# Patient Record
Sex: MALE | Race: WHITE | Employment: UNEMPLOYED | URBAN - METROPOLITAN AREA
[De-identification: names, ages, dates, MRNs, and addresses within clinical notes are randomized per-mention and may not be internally consistent; named-entity substitution may affect disease eponyms.]

---

## 2024-10-11 ENCOUNTER — OFFICE VISIT (OUTPATIENT)
Dept: URGENT CARE | Age: 24
End: 2024-10-11

## 2024-10-11 VITALS
DIASTOLIC BLOOD PRESSURE: 78 MMHG | TEMPERATURE: 98 F | SYSTOLIC BLOOD PRESSURE: 126 MMHG | HEART RATE: 72 BPM | OXYGEN SATURATION: 98 % | BODY MASS INDEX: 21.86 KG/M2 | HEIGHT: 66 IN | WEIGHT: 136 LBS

## 2024-10-11 DIAGNOSIS — J02.9 SORE THROAT: Primary | ICD-10-CM

## 2024-10-11 DIAGNOSIS — J06.9 VIRAL URI: ICD-10-CM

## 2024-10-11 LAB
INFLUENZA A ANTIGEN, POC: NEGATIVE
INFLUENZA B ANTIGEN, POC: NEGATIVE
Lab: NORMAL
PERFORMING INSTRUMENT: NORMAL
QC PASS/FAIL: NORMAL
SARS-COV-2, POC: NORMAL
STREPTOCOCCUS A RNA: NEGATIVE

## 2024-10-11 ASSESSMENT — ENCOUNTER SYMPTOMS
NAUSEA: 0
EYE REDNESS: 0
SORE THROAT: 1
ABDOMINAL PAIN: 0
COUGH: 0
EYE PAIN: 0
SHORTNESS OF BREATH: 0
EYE DISCHARGE: 0
DIARRHEA: 0
VOMITING: 0

## 2024-10-11 NOTE — PROGRESS NOTES
Germán Vann (: 2000) is a 24 y.o. male, New patient, here for evaluation of the following chief complaint(s):  Generalized Body Aches and Pharyngitis (Started yesterday. No hx of strep. )      ASSESSMENT/PLAN:    ICD-10-CM    1. Sore throat  J02.9 POCT Rapid Strep A DNA ([facility])     POCT Influenza A/B Antigen ([facility])     POCT COVID-19, Antigen      2. Viral URI  J06.9           - Pt just wanted a rapid test for strep, covid and flu. Pt is negative for strep, covid, flu A and flu B. Low concern for strep pharyngitis, otitis media, bacterial pneumonia, bronchitis, sinusitis or sepsis.  - Pt to drink lots of fluids  - Pt ok to take tylenol and ibuprofen as needed  - Pt to call if any symptoms worsen or follow up with PCP if not better in 3-4 days  - Pt to go to ER if have shortness of breath, chest pain, worsening fever or lethargy  - Pt to isolate until fever free for 24 hours without fever reducers    Discussed PCP follow up for persisting or worsening symptoms, or to return to the clinic if unable to obtain PCP follow up for worsening symptoms.    The patient tolerated their visit well. The patient and/or the family were informed of the results of any tests, a time was given to answer questions, a plan was proposed and they agreed with plan. Reviewed AVS with treatment instructions and answered questions - pt/family expresses understanding and agreement with the discussed treatment plan and AVS instructions.      SUBJECTIVE/OBJECTIVE:  HPI:   24 y.o. male presents for complaint of pt states he started yesterday with a sore throat.    Admits fever, body aches and headache.   Denies ear pain, nasal congestion, cough, abdominal pain, vomiting or diarrhea.     Has taken dayquil at home. No known sick contacts.         History provided by:  Patient   used: No        VITAL SIGNS  Vitals:    10/11/24 0922   BP: 126/78   Pulse: 72   Temp: 98 °F (36.7 °C)   TempSrc: Oral   SpO2:

## 2024-10-11 NOTE — PATIENT INSTRUCTIONS
- Pt to drink lots of fluids  - Pt ok to take tylenol and ibuprofen as needed  - Pt to call if any symptoms worsen or follow up with PCP  - Pt to go to ER if have shortness of breath or chest pain  - Pt to isolate until fever free for 24 hours without fever reducers

## 2024-10-15 ENCOUNTER — HOSPITAL ENCOUNTER (EMERGENCY)
Age: 24
Discharge: HOME | End: 2024-10-15
Attending: EMERGENCY MEDICINE
Payer: COMMERCIAL

## 2024-10-15 ENCOUNTER — APPOINTMENT (OUTPATIENT)
Age: 24
End: 2024-10-15
Payer: COMMERCIAL

## 2024-10-15 VITALS
TEMPERATURE: 98.2 F | BODY MASS INDEX: 22.11 KG/M2 | SYSTOLIC BLOOD PRESSURE: 143 MMHG | DIASTOLIC BLOOD PRESSURE: 73 MMHG | WEIGHT: 137 LBS | HEART RATE: 89 BPM | OXYGEN SATURATION: 100 % | RESPIRATION RATE: 16 BRPM

## 2024-10-15 DIAGNOSIS — J20.9 ACUTE BRONCHITIS, UNSPECIFIED ORGANISM: Primary | ICD-10-CM

## 2024-10-15 PROCEDURE — 99283 EMERGENCY DEPT VISIT LOW MDM: CPT

## 2024-10-15 PROCEDURE — 6370000000 HC RX 637 (ALT 250 FOR IP): Performed by: EMERGENCY MEDICINE

## 2024-10-15 PROCEDURE — 71045 X-RAY EXAM CHEST 1 VIEW: CPT

## 2024-10-15 PROCEDURE — 94640 AIRWAY INHALATION TREATMENT: CPT

## 2024-10-15 PROCEDURE — 94761 N-INVAS EAR/PLS OXIMETRY MLT: CPT

## 2024-10-15 RX ORDER — DOXYCYCLINE HYCLATE 100 MG
100 TABLET ORAL ONCE
Status: COMPLETED | OUTPATIENT
Start: 2024-10-15 | End: 2024-10-15

## 2024-10-15 RX ORDER — IPRATROPIUM BROMIDE AND ALBUTEROL SULFATE 2.5; .5 MG/3ML; MG/3ML
1 SOLUTION RESPIRATORY (INHALATION) ONCE
Status: COMPLETED | OUTPATIENT
Start: 2024-10-15 | End: 2024-10-15

## 2024-10-15 RX ORDER — DOXYCYCLINE HYCLATE 100 MG
100 TABLET ORAL 2 TIMES DAILY
Qty: 14 TABLET | Refills: 0 | Status: SHIPPED | OUTPATIENT
Start: 2024-10-15 | End: 2024-10-22

## 2024-10-15 RX ORDER — ALBUTEROL SULFATE 90 UG/1
2 INHALANT RESPIRATORY (INHALATION) 4 TIMES DAILY PRN
Qty: 18 G | Refills: 0 | Status: SHIPPED | OUTPATIENT
Start: 2024-10-15

## 2024-10-15 RX ADMIN — DOXYCYCLINE HYCLATE 100 MG: 100 TABLET, COATED ORAL at 20:35

## 2024-10-15 RX ADMIN — IPRATROPIUM BROMIDE AND ALBUTEROL SULFATE 1 DOSE: .5; 2.5 SOLUTION RESPIRATORY (INHALATION) at 19:29

## 2024-10-15 ASSESSMENT — PAIN - FUNCTIONAL ASSESSMENT: PAIN_FUNCTIONAL_ASSESSMENT: NONE - DENIES PAIN

## 2024-10-15 NOTE — ED PROVIDER NOTES
SpO2   131/88 -- -- 98.2 °F (36.8 °C) -- 88 20 98 %      Height Weight - Scale         -- 62.1 kg (137 lb)            GENERAL APPEARANCE: Awake alert oriented x 3 resting comfortably no respiratory distress.  HEAD: Normocephalic. Atraumatic.  EYES: BHAVESH. EOM's grossly intact.   ENT: Mucous membranes are moist.   NECK: Supple, trachea midline.   HEART: Rate and rhythm regular  LUNGS: Scattered expiratory wheezes and crackles worse in the left base and clear with coughing.  No intercostal retractions or accessory muscle use.  Thorax nontender.  ABDOMEN: Soft.  Nontender nondistended positive bowel sounds.  No masses guarding rebound rigidity  EXTREMITIES: Pulses equal bilaterally.  Full range of motion all extremities  MUSCULOSKELETAL: No midline bony tenderness.  No flank or CVA tenderness.  SKIN: Warm and dry. No acute rashes.   NEUROLOGICAL: No focal deficits.  Gait normal.  PSYCHIATRIC: Normal mood and affect.    DIAGNOSTIC RESULTS     LABS:   Labs Reviewed - No data to display   When ordered only abnormal lab results are displayed. All other labs were within normal range or not returned as of this dictation.     RADIOLOGY:      Non-plain film images such as CT, Ultrasound and MRI are read by the radiologist. Plain radiographic images are visualized and preliminarily interpreted by the ED Provider with the below findings:   Interpretation per the Radiologist below, if available at the time of this note:  XR CHEST PORTABLE    (Results Pending)            EKG:      RHYTHM STRIP:    PROCEDURES   Unless otherwise noted below, none     CRITICAL CARE TIME   I personally saw the patient and independently provided 0 minutes of non-concurrent critical care out of the total shared critical care time excluding separately billable procedures.        Vitals:    Vitals:    10/15/24 1821   BP: 131/88   Pulse: 88   Resp: 20   Temp: 98.2 °F (36.8 °C)   SpO2: 98%   Weight: 62.1 kg (137 lb)               Exclusion criteria - the

## 2024-10-16 NOTE — DISCHARGE INSTRUCTIONS
Return to emergency department if difficulty breathing, fever, worse in any way or any problems.    Force fluids.  Avoid exposure to cigarette smoke.    Take all antibiotics as directed with food until finished.